# Patient Record
Sex: FEMALE | Race: WHITE | NOT HISPANIC OR LATINO | Employment: OTHER | URBAN - METROPOLITAN AREA
[De-identification: names, ages, dates, MRNs, and addresses within clinical notes are randomized per-mention and may not be internally consistent; named-entity substitution may affect disease eponyms.]

---

## 2022-01-21 PROBLEM — I10 PRIMARY HYPERTENSION: Status: ACTIVE | Noted: 2022-01-21

## 2022-01-21 PROBLEM — Z12.31 ENCOUNTER FOR SCREENING MAMMOGRAM FOR MALIGNANT NEOPLASM OF BREAST: Status: ACTIVE | Noted: 2022-01-21

## 2022-01-21 PROBLEM — M17.0 PRIMARY OSTEOARTHRITIS OF BOTH KNEES: Status: ACTIVE | Noted: 2022-01-21

## 2022-01-21 PROBLEM — C83.30 DLBCL (DIFFUSE LARGE B CELL LYMPHOMA) (HCC): Status: ACTIVE | Noted: 2018-03-08

## 2022-01-21 PROBLEM — M85.859 OSTEOPENIA OF HIP: Status: ACTIVE | Noted: 2022-01-21

## 2022-01-21 PROBLEM — C85.91 NON-HODGKIN LYMPHOMA OF LYMPH NODES OF NECK (HCC): Status: ACTIVE | Noted: 2022-01-21

## 2022-01-21 PROBLEM — Z85.89 HX OF SQUAMOUS CELL CARCINOMA: Status: ACTIVE | Noted: 2022-01-21

## 2022-09-13 PROBLEM — Z01.818 PRE-OP EVALUATION: Status: ACTIVE | Noted: 2022-09-13

## 2023-09-19 PROBLEM — J32.1 CHRONIC FRONTAL SINUSITIS: Status: ACTIVE | Noted: 2023-09-19

## 2023-09-19 PROBLEM — M54.41 CHRONIC BILATERAL LOW BACK PAIN WITH BILATERAL SCIATICA: Status: ACTIVE | Noted: 2023-09-19

## 2023-09-19 PROBLEM — M54.42 CHRONIC BILATERAL LOW BACK PAIN WITH BILATERAL SCIATICA: Status: ACTIVE | Noted: 2023-09-19

## 2023-09-19 PROBLEM — G89.29 CHRONIC BILATERAL LOW BACK PAIN WITH BILATERAL SCIATICA: Status: ACTIVE | Noted: 2023-09-19

## 2023-09-19 PROBLEM — J32.0 CHRONIC MAXILLARY SINUSITIS: Status: ACTIVE | Noted: 2023-09-19

## 2023-10-18 PROBLEM — J32.1 CHRONIC FRONTAL SINUSITIS: Status: RESOLVED | Noted: 2023-09-19 | Resolved: 2023-10-18

## 2023-10-18 PROBLEM — J32.0 CHRONIC MAXILLARY SINUSITIS: Status: RESOLVED | Noted: 2023-09-19 | Resolved: 2023-10-18

## 2023-11-01 PROBLEM — M16.11 OSTEOARTHRITIS OF RIGHT HIP: Status: ACTIVE | Noted: 2023-11-01

## 2023-11-01 PROBLEM — Z98.890 S/P RIGHT ROTATOR CUFF REPAIR: Status: ACTIVE | Noted: 2023-11-01

## 2023-11-01 PROBLEM — Z01.818 PRE-OP EVALUATION: Status: RESOLVED | Noted: 2022-09-13 | Resolved: 2023-11-01

## 2023-11-08 PROBLEM — M21.40 PES PLANUS: Status: ACTIVE | Noted: 2023-11-08

## 2023-11-14 PROBLEM — G47.62 NOCTURNAL LEG CRAMPS: Status: ACTIVE | Noted: 2023-11-14

## 2023-11-22 PROBLEM — Z13.0 SCREENING, ANEMIA, DEFICIENCY, IRON: Status: ACTIVE | Noted: 2022-01-21

## 2023-11-29 PROBLEM — E03.8 SUBCLINICAL HYPOTHYROIDISM: Status: ACTIVE | Noted: 2023-11-29

## 2023-12-28 PROBLEM — R39.9 UTI SYMPTOMS: Status: ACTIVE | Noted: 2023-12-28

## 2023-12-28 PROBLEM — N81.10 BLADDER PROLAPSE, FEMALE, ACQUIRED: Status: ACTIVE | Noted: 2023-12-28

## 2024-02-07 PROBLEM — R82.90 ABNORMAL URINE ODOR: Status: ACTIVE | Noted: 2024-02-07

## 2024-02-07 PROBLEM — M25.431 PAIN AND SWELLING OF RIGHT WRIST: Status: ACTIVE | Noted: 2024-02-07

## 2024-02-07 PROBLEM — M25.531 PAIN AND SWELLING OF RIGHT WRIST: Status: ACTIVE | Noted: 2024-02-07

## 2024-03-08 ENCOUNTER — APPOINTMENT (OUTPATIENT)
Dept: CARDIOLOGY | Facility: MEDICAL CENTER | Age: 74
End: 2024-03-08
Attending: INTERNAL MEDICINE
Payer: MEDICARE

## 2024-03-11 ENCOUNTER — TELEPHONE (OUTPATIENT)
Dept: CARDIOLOGY | Facility: MEDICAL CENTER | Age: 74
End: 2024-03-11
Payer: MEDICARE

## 2024-03-11 NOTE — TELEPHONE ENCOUNTER
Recvd voice message from patient - she has already established care with [facility] and does not want to come to [facility]. FYI to Dr. Kyle

## 2024-03-11 NOTE — TELEPHONE ENCOUNTER
LM on patient's voicemail requesting a call back to schedule an appointment with Dr. Mariee, per Dr. Kyle's request

## 2024-03-11 NOTE — TELEPHONE ENCOUNTER
----- Message from Alexander Kyle M.D. sent at 3/8/2024  9:32 PM PST -----  Regarding: FW: 3/8 SVT eval with DS  Can you see what happened to her EP follow-up, she should see the EP service  ----- Message -----  From: Alexander Kyle M.D.  Sent: 3/5/2024   7:21 PM PST  To: Alexander Kyle M.D.  Subject: 3/8 SVT eval with DS

## 2024-03-19 PROBLEM — R91.1 NODULE OF MIDDLE LOBE OF RIGHT LUNG: Status: ACTIVE | Noted: 2024-03-19

## 2024-03-19 PROBLEM — I47.19 AVNRT (AV NODAL RE-ENTRY TACHYCARDIA) (HCC): Status: ACTIVE | Noted: 2024-03-19
